# Patient Record
Sex: FEMALE
[De-identification: names, ages, dates, MRNs, and addresses within clinical notes are randomized per-mention and may not be internally consistent; named-entity substitution may affect disease eponyms.]

---

## 2019-07-02 PROBLEM — Z00.00 ENCOUNTER FOR PREVENTIVE HEALTH EXAMINATION: Status: ACTIVE | Noted: 2019-07-02

## 2019-07-17 ENCOUNTER — APPOINTMENT (OUTPATIENT)
Dept: OTOLARYNGOLOGY | Facility: CLINIC | Age: 74
End: 2019-07-17
Payer: MEDICARE

## 2019-07-17 VITALS
WEIGHT: 148 LBS | SYSTOLIC BLOOD PRESSURE: 109 MMHG | HEART RATE: 90 BPM | DIASTOLIC BLOOD PRESSURE: 61 MMHG | BODY MASS INDEX: 27.94 KG/M2 | HEIGHT: 61 IN

## 2019-07-17 DIAGNOSIS — M26.609 UNSPECIFIED TEMPOROMANDIBULAR JOINT DISORDER: ICD-10-CM

## 2019-07-17 DIAGNOSIS — H60.8X3 OTHER OTITIS EXTERNA, BILATERAL: ICD-10-CM

## 2019-07-17 PROCEDURE — 99213 OFFICE O/P EST LOW 20 MIN: CPT | Mod: 25

## 2019-07-17 PROCEDURE — 69210 REMOVE IMPACTED EAR WAX UNI: CPT

## 2019-07-17 RX ORDER — NEOMYCIN AND POLYMYXIN B SULFATES AND HYDROCORTISONE OTIC 10; 3.5; 1 MG/ML; MG/ML; [USP'U]/ML
3.5-10000-1 SUSPENSION AURICULAR (OTIC) TWICE DAILY
Qty: 1 | Refills: 0 | Status: ACTIVE | COMMUNITY
Start: 2019-07-17 | End: 1900-01-01

## 2019-07-17 NOTE — PHYSICAL EXAM
[FreeTextEntry1] : General:\par The patient was alert and oriented and in no distress.\par Voice was clear.\par \par \par \par Face:\par The patient had no facial asymmetry or mass.\par The skin was unremarkable.\par \par Nose: \par The external nose had no significant deformity.  There was no facial tenderness.  On anterior rhinoscopy, the nasal mucosa was clear.  The anterior septum was .  Ther deflected to the rightt  There were no visualized polyps purulence  or masses.\par \par Oral cavity:\par The oral mucosa was normal.\par The oral and base of tongue were clear and without mass.\par The gingival and buccal mucosa were moist and without lesions.\par The palate moved well.\par There was no cleft to the palate.\par There appeared to be good salivary flow.  \par There was no pus, erythema or mass in the oral cavity.\par She had ill fitting dentures\par \par TMJ:\par The temporomandibular joints were nontender.\par There was no abnormal crepitus and no significant malocclusion\par \par Neck: \par The neck was symmetrical.\par The parotid and submandibular glands were normal without masses.\par The trachea was midline and there was no unusual crepitus.\par The thyroid was smooth and nontender and no masses were palpated.\par There was no significant cervical adenopathy.\par \par Ears:\par Both ear canals were dry flaking with a cerumen impaction on the right that was cleared with microscopic techniques and suction and forward biting forceps without trauma.\par \par Both eardrums are intact and mobile.\par \par Neuro:\par Neurologically, the patient was awake, alert, and oriented to person, place and time. There were no obvious focal neurologic abnormalities.  Cranial nerves II through XII were grossly intact.

## 2019-07-17 NOTE — ASSESSMENT
[FreeTextEntry1] : It is my impression that the patient has an an exczematoid otitis externa with secondary acute component The pathogenesis was discussed.  I suggested avoiding Q-tips. I suggested 5 days of Cortisporin  I recommended topical moisturizing and using or other oil drops.  I suggested repeat evaluation in 6 months or earlier should the need arise.\par I asked her to call and if she is not improving would suggest a trial of Dermotic drops as well. She has a generalized dermatitis. Some of her discomfort could also be from her temporomandibular joint. Although I did not feel much tenderness or crepitus she has ill fitting dentures.\par

## 2019-07-17 NOTE — REVIEW OF SYSTEMS
[Ear Pain] : ear pain [Ear Itch] : ear itch [Nasal Congestion] : nasal congestion [Feeling Tired] : feeling tired [Eyesight Problems] : eyesight problems [Shortness Of Breath] : shortness of breath [SOB on Exertion] : shortness of breath during exertion [Constipation] : constipation [Heartburn] : heartburn [Joint Pain] : joint pain [Limb Pain] : limb pain [Itching] : itching [Sleep Disturbances] : sleep disturbances [Anxiety] : anxiety [Depression] : depression [Negative] : Heme/Lymph

## 2019-07-17 NOTE — HISTORY OF PRESENT ILLNESS
[de-identified] : IVELISSE PEREZ is a 73 year old female who comes in complaining of Pruritus of both ears and intermittent discomfort in both ears. She denies any acute change in hearing. She does have history of dermatitis elsewhere.The patient had no other ear nose or throat complaints at this visit.

## 2019-07-17 NOTE — CONSULT LETTER
[FreeTextEntry2] : ECHO ELIAS\par \par \par \par \par Thank you very much for allowing me to participate in the care of your patient.\par \par Sincerely,\par \par \par  [FreeTextEntry1] : \par \par Dear  Dr. ELIAS,\par \par I had the pleasure of seeing your patient today.  \par Please see my note below.\par \par \par Thank you very much for allowing me to participate in the care of your patient.\par \par Sincerely,\par \par Edward Davis\par \par \par Ryder Davis MD\par [facility]\par [facility]\par  [facility]\par \par \par \par \par